# Patient Record
Sex: FEMALE | Race: WHITE
[De-identification: names, ages, dates, MRNs, and addresses within clinical notes are randomized per-mention and may not be internally consistent; named-entity substitution may affect disease eponyms.]

---

## 2023-04-18 ENCOUNTER — HOSPITAL ENCOUNTER (INPATIENT)
Dept: HOSPITAL 94 - ADULT MH | Age: 18
LOS: 6 days | Discharge: HOME | DRG: 885 | End: 2023-04-24
Attending: PSYCHIATRY & NEUROLOGY | Admitting: PSYCHIATRY & NEUROLOGY
Payer: COMMERCIAL

## 2023-04-18 VITALS — BODY MASS INDEX: 22.39 KG/M2 | WEIGHT: 142.64 LBS | HEIGHT: 67 IN

## 2023-04-18 VITALS — DIASTOLIC BLOOD PRESSURE: 68 MMHG | SYSTOLIC BLOOD PRESSURE: 119 MMHG

## 2023-04-18 DIAGNOSIS — R45.851: ICD-10-CM

## 2023-04-18 DIAGNOSIS — Z81.8: ICD-10-CM

## 2023-04-18 DIAGNOSIS — Z20.822: ICD-10-CM

## 2023-04-18 DIAGNOSIS — Z86.59: ICD-10-CM

## 2023-04-18 DIAGNOSIS — Z63.72: ICD-10-CM

## 2023-04-18 DIAGNOSIS — Z79.899: ICD-10-CM

## 2023-04-18 DIAGNOSIS — Z81.1: ICD-10-CM

## 2023-04-18 DIAGNOSIS — F17.200: ICD-10-CM

## 2023-04-18 DIAGNOSIS — J45.909: ICD-10-CM

## 2023-04-18 DIAGNOSIS — F12.10: ICD-10-CM

## 2023-04-18 DIAGNOSIS — F31.30: Primary | ICD-10-CM

## 2023-04-18 DIAGNOSIS — F41.0: ICD-10-CM

## 2023-04-18 PROCEDURE — 83036 HEMOGLOBIN GLYCOSYLATED A1C: CPT

## 2023-04-18 PROCEDURE — 36415 COLL VENOUS BLD VENIPUNCTURE: CPT

## 2023-04-18 PROCEDURE — 80061 LIPID PANEL: CPT

## 2023-04-18 PROCEDURE — 87811 SARS-COV-2 COVID19 W/OPTIC: CPT

## 2023-04-18 PROCEDURE — 87081 CULTURE SCREEN ONLY: CPT

## 2023-04-18 RX ADMIN — NICOTINE POLACRILEX PRN MG: 2 LOZENGE ORAL at 16:34
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
## 2023-04-19 VITALS — DIASTOLIC BLOOD PRESSURE: 67 MMHG | SYSTOLIC BLOOD PRESSURE: 116 MMHG

## 2023-04-19 VITALS — DIASTOLIC BLOOD PRESSURE: 72 MMHG | SYSTOLIC BLOOD PRESSURE: 107 MMHG

## 2023-04-19 LAB
CHOLEST SERPL-MCNC: 171 MG/DL (ref 0–200)
CHOLEST/HDLC SERPL: 3.3 {RATIO} (ref 0–4.99)
HBA1C MFR BLD: 5.4 % (ref 4.5–6.2)
HDLC SERPL-MCNC: 52 MG/DL (ref 35–60)
LDLC SERPL DIRECT ASSAY-MCNC: 98 MG/DL (ref 50–100)
TRIGL SERPL-MCNC: 117 MG/DL (ref 20–135)

## 2023-04-19 RX ADMIN — NICOTINE POLACRILEX PRN MG: 2 LOZENGE ORAL at 18:57

## 2023-04-19 RX ADMIN — NICOTINE SCH PATCH: 21 PATCH, EXTENDED RELEASE TRANSDERMAL at 08:33

## 2023-04-19 RX ADMIN — NICOTINE POLACRILEX PRN MG: 2 LOZENGE ORAL at 13:57

## 2023-04-19 RX ADMIN — ARIPIPRAZOLE SCH MG: 15 TABLET ORAL at 21:03

## 2023-04-19 NOTE — NUR
Assumed pt care at 1400.  Pt is currently reading a book in her room, denies MH symptoms 
currently.

## 2023-04-19 NOTE — NUR
Nurses Progress Note:



Problem: Pt admitted from [place] [facility] on a 5150 for DTS. Pt drove herself to the ER 
reporting, "If I go home, I will take pills so I can die."



Interventions: 1:1 assessment, therapeutic communication, active listening, provided clear 
and simple instructions, medication administration/education/monitoring, behavior monitoring 
and intervention as needed; provided distraction, direction, positive reinforcement, reality 
orientation, maintained a safe and supportive environment, and Q15 minute safety checks.



Response: Pt received in rec room where she is listening to peers play the Cubresar. Pt is 
pleasant and cooperative. Pt is alert and oriented x4. Pt has no scheduled HS medications 
but was given Trazodone 50 mg for sleep. Pt is social with peers. Encourage pt. to attend 
groups during the day and attend to ADLs before going to sleep. Pt endorses SI with a plan 
to overdose but does not want to act on. Pt denies HI/AH/VH. Monitor for safety q15 minutes. 




Plan: Pt. continues to require medication adjustments and a safe and supportive environment.

## 2023-04-19 NOTE — NUR
NURSING PROGRESS NOTE



Problem: Pt admitted from [facility] on a 5150 for DTS. Pt drove herself to the ER 
reporting, "If I go home, I will take pills so I can die." 



Interventions: Provided 1:1 assessment with therapeutic communication and active listening; 
Maintained a safe and supportive environment; provided clear and simple instructions, 
attempted to orient to reality, provided redirection, limit setting, and maintained Q 15min 
safety checks. 



Response: Received pt sleeping at shift change. Pt woke for breakfast. Pt observed after 
breakfast drawing while sitting on her bed. Pt states she likes to draw "when I have time." 
Pt later tracked this nurse down and asked for her Nicotine Patch. She shared she was 
feeling anxious. Administered PRN Atarax 50mg with good effect.  During morning assessment 
pt shared she has been having problems with her moods. She states, "like this morning, I 
thought I was doing okay feeling better than the next minute I felt irritated and sad, like 
I could cry."  She reports sleeping well last night after taking Trazodone for sleep. 



Plan: Pt is in need of a safe and supportive environment at this time.

## 2023-04-20 VITALS — SYSTOLIC BLOOD PRESSURE: 118 MMHG | DIASTOLIC BLOOD PRESSURE: 70 MMHG

## 2023-04-20 VITALS — SYSTOLIC BLOOD PRESSURE: 93 MMHG | DIASTOLIC BLOOD PRESSURE: 51 MMHG

## 2023-04-20 RX ADMIN — NICOTINE POLACRILEX PRN MG: 2 LOZENGE ORAL at 13:26

## 2023-04-20 RX ADMIN — Medication SCH UNIT: at 08:00

## 2023-04-20 RX ADMIN — ARIPIPRAZOLE SCH MG: 15 TABLET ORAL at 20:43

## 2023-04-20 RX ADMIN — NICOTINE POLACRILEX PRN MG: 2 LOZENGE ORAL at 10:06

## 2023-04-20 RX ADMIN — NICOTINE SCH PATCH: 21 PATCH, EXTENDED RELEASE TRANSDERMAL at 08:08

## 2023-04-20 RX ADMIN — NICOTINE POLACRILEX PRN MG: 2 LOZENGE ORAL at 18:22
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
## 2023-04-20 NOTE — NUR
LVN documentation:

I have reviewed and agree with all interventions, assessments performed and documented by 
Carley BARTON.

## 2023-04-20 NOTE — NUR
Nurses Progress Note: Marisol LOCKWOOD



Problem: Pt admitted from [place] [facility] on a 5150 for DTS. Pt drove herself to the ER 
reporting, "If I go home, I will take pills so I can die."



Interventions: 1:1 assessment, therapeutic communication, active listening, provided clear 
and simple instructions, medication administration/education/monitoring, behavior monitoring 
and intervention as needed; provided distraction, direction, positive reinforcement, reality 
orientation, maintained a safe and supportive environment, and Q15 minute safety checks.



Response: Received pt. in rec room, pt. socializing with peers. During 1:1 interview pt. 
started to cry saying, I made so many mistakes in the past and I dont like myself for 
hurting the people I love. Writer used therapeutic listening, then reassured pt. that she 
can overcome her past mistakes. Pt denies SI/HI/AH/VH. Her depression is rated 8/10 with her 
anxiety 7/10. Atarax 50mg given. Encouraged pt. to attend groups to learn coping skills and 
try to journal.  Pt is medication compliant. Nicotine lozenge was given with medication 
pass. Monitor for safety q15 minutes for safety.



Plan: Pt. continues to require medication adjustments and a safe and supportive environment.

## 2023-04-20 NOTE — NUR
Nurses Progress Note: Marisol



Problem: Pt admitted from [place] ER on a 5150 for DTS. Pt drove herself to the ER 
reporting, "If I go home, I will take pills so I can die."



Interventions: Provided 1:1 assessment, therapeutic communication, active listening, 
medication administration/education/monitoring, provided positive reinforcement, reality 
orientation, maintained a safe and supportive environment, and Q15 minute safety checks.



Response: Patient approached this writer shortly after shift change endorsing feelings on 
anxiety. She was given PRN Atarax per MD order with noted effectiveness. Pt joined for 
breakfast in the group room with peers and was receptive to scheduled medication. She is 
pleasant, social, and cooperative with care. Patient denies SI/HI, AH or VH. She endorsed 
feeling super anxious after lunch and was provided with PRN medication per MD order. Pt 
was noted to be active on the unit the majority of the shift. She was observed sitting in 
the recreation room listening to another peer play the guitar. Pt joined for all meal and 
snack times in the group room today. 



Plan: Pt. continues to require medication adjustments and a safe and supportive environment.

## 2023-04-21 VITALS — SYSTOLIC BLOOD PRESSURE: 123 MMHG | DIASTOLIC BLOOD PRESSURE: 74 MMHG

## 2023-04-21 VITALS — DIASTOLIC BLOOD PRESSURE: 78 MMHG | SYSTOLIC BLOOD PRESSURE: 102 MMHG

## 2023-04-21 RX ADMIN — ARIPIPRAZOLE SCH MG: 15 TABLET ORAL at 20:19

## 2023-04-21 RX ADMIN — NICOTINE POLACRILEX PRN MG: 2 LOZENGE ORAL at 13:31

## 2023-04-21 RX ADMIN — NICOTINE POLACRILEX PRN MG: 2 LOZENGE ORAL at 18:07

## 2023-04-21 RX ADMIN — NICOTINE SCH PATCH: 21 PATCH, EXTENDED RELEASE TRANSDERMAL at 08:32

## 2023-04-21 RX ADMIN — NICOTINE POLACRILEX PRN MG: 2 LOZENGE ORAL at 08:35

## 2023-04-21 RX ADMIN — Medication SCH UNIT: at 08:00

## 2023-04-21 NOTE — NUR
Nurses Progress Note: 



Problem: Pt admitted from [facility] on a 5150 for DTS. Pt drove herself to the ER 
reporting, "If I go home, I will take pills so I can die."



Interventions: Provided 1:1 assessment, therapeutic communication, active listening, 
medication administration/education/monitoring, provided positive reinforcement, reality 
orientation, maintained a safe and supportive environment, and Q15 minute safety checks.



Response: 1:1 done at bedside, medications administered with no issues. Denies SI/HI but is 
still feeling anxious and depressed. She requested her nicotine lozenge at 0705 which she 
stated helped her anxiety. Pt states she had feelings of nausea and one episode of loose 
stools this AM but once she ate breakfast she felt better. Nurse educated pt on possible 
side effects of new medication and that they should subside over time. Pt was ok with. Pt 
birth control discontinued per pt request, she voiced concerns of it causing mood 
fluctuations. Pt attended group. Pt was calm and cooperative this shift. Seen socializing 
with peers. 



Plan: Pt. continues to require medication adjustments and a safe and supportive environment.

## 2023-04-21 NOTE — NUR
Covid test obtained and sent to the lab.

-------------------------------------------------------------------------------

Addendum: 04/21/23 at 1617 by Dena Nielsen RN

-------------------------------------------------------------------------------

Covid test to be obtained Sunday 4/23. Test obtained this shift in error.

## 2023-04-21 NOTE — NUR
pt  in community room, singing while another played guitar.  the guitar got passed off to 
another pt and much conversation with other pts. took HS medications and went to bed.

## 2023-04-21 NOTE — NUR
DISCHARGE PLAN-MON 4/24/23 8 AM



Marisol's parents, Suleiman and Kia, are planning on picking Marisol up around 8 AM on 
Monday to drive her to [facility]. She is going to fly to [facility] where she will get 
picked up by staff from the facility, [facility]. [facility] has been accepted at [facility] and they will have a bed available to her on Monday. They have requested 
her medications get sent to [facility] (pharmacy is saved in discharge pharmacy). 



Suleiman ph# [phone]

Kia ph# [phone]



ARMAAN Bloom

-------------------------------------------------------------------------------

Addendum: 04/21/23 at 1549 by Jackie Leyva 

-------------------------------------------------------------------------------

Please do a Covid test Sunday PM as required by the facility she has been accepted at.



ARMAAN Bloom

## 2023-04-22 VITALS — DIASTOLIC BLOOD PRESSURE: 64 MMHG | SYSTOLIC BLOOD PRESSURE: 111 MMHG

## 2023-04-22 VITALS — SYSTOLIC BLOOD PRESSURE: 111 MMHG | DIASTOLIC BLOOD PRESSURE: 64 MMHG

## 2023-04-22 VITALS — SYSTOLIC BLOOD PRESSURE: 102 MMHG | DIASTOLIC BLOOD PRESSURE: 63 MMHG

## 2023-04-22 RX ADMIN — NICOTINE SCH PATCH: 21 PATCH, EXTENDED RELEASE TRANSDERMAL at 08:28

## 2023-04-22 RX ADMIN — NICOTINE POLACRILEX PRN MG: 2 LOZENGE ORAL at 18:11

## 2023-04-22 RX ADMIN — ARIPIPRAZOLE SCH MG: 15 TABLET ORAL at 20:50

## 2023-04-22 RX ADMIN — NICOTINE POLACRILEX PRN MG: 2 LOZENGE ORAL at 11:15

## 2023-04-22 NOTE — NUR
Nurses Progress Note:



Problem: Pt admitted from [facility] [facility] on a 5150 for DTS. Pt drove herself to the ER 
reporting, "If I go home, I will take pills so I can die."



Interventions: Provided 1:1 assessment, therapeutic communication, active listening, 
medication administration/education/monitoring, provided positive reinforcement, reality 
orientation, maintained a safe and supportive environment, and Q15 minute safety checks.



Response: Nurse received pt asleep at change of shift, she awoke for breakfast. Pt did not 
feel nauseas or have loose stools this AM. She did have some anxiety with c/o shakiness upon 
rising. Pt stated once she started eating breakfast her anxiety subsided and she declined 
the need for anxiety meds at that time. 1:1 done at bedside. Pt denied SI. Pt had visitors 
this shift and attended group and went outside on the patio. PRN Atarax utilized at 1540 for 
c/o anxiety. Pt stated she tries to push through her anxious feelings because she doesnt 
want to become dependent on medications but shrugged her shoulders and said, but "if I need 
them, I need them." Pt calm, cooperative and pleasant.  





Plan: DISCHARGE PLAN-MON 4/24/23 8 AM;

Marisol's parents, Suleiman and Kia, are planning on picking Marisol up around 8 AM on 
Monday 4/24 to drive her to [facility]. She is going to fly to [facility] where she will 
get picked up by staff from the facility, [facility]. She has been accepted at [facility] and they will have a bed available to her on Monday. They have requested 
her medications get sent to [facility] (pharmacy is saved in discharge pharmacy).

## 2023-04-22 NOTE — NUR
Nurses Progress Note: 



Problem: Pt admitted from [facility] [facility] on a 5150 for DTS. Pt drove herself to the ER 
reporting, "If I go home, I will take pills so I can die."



Interventions: Provided 1:1 assessment, therapeutic communication, active listening, 
medication administration/education/monitoring, provided positive reinforcement, reality 
orientation, maintained a safe and supportive environment, and Q15 minute safety checks.



Response: Patient was received singing with other patient in rec room at change of shift. 
Patient continued to sing different songs until snack time. Patient requested prn clonidine 
for aniexty and called multiple family members before going to bed. 



Plan: Pt. continues to require medication adjustments and a safe and supportive environment.

## 2023-04-22 NOTE — NUR
LVN documentation:

I have reviewed all interventions, assessments performed and documented by Darrion BARTON.

## 2023-04-23 VITALS — SYSTOLIC BLOOD PRESSURE: 109 MMHG | DIASTOLIC BLOOD PRESSURE: 80 MMHG

## 2023-04-23 VITALS — DIASTOLIC BLOOD PRESSURE: 74 MMHG | SYSTOLIC BLOOD PRESSURE: 121 MMHG

## 2023-04-23 RX ADMIN — NICOTINE POLACRILEX PRN MG: 2 LOZENGE ORAL at 15:43

## 2023-04-23 RX ADMIN — NICOTINE POLACRILEX PRN MG: 2 LOZENGE ORAL at 09:28

## 2023-04-23 RX ADMIN — NICOTINE SCH PATCH: 21 PATCH, EXTENDED RELEASE TRANSDERMAL at 08:06

## 2023-04-23 RX ADMIN — ARIPIPRAZOLE SCH MG: 15 TABLET ORAL at 21:11

## 2023-04-23 NOTE — NUR
Initial: Pt admit DX bipolar 1 d/o, depressed and SI per EMR. PO 

fluctuates overall ~59% avg regular diet though improving to ~71% avg past 

2.5 days participating in snacks likely meeting estimated needs. M 4/21. 

No nutrition interventions at this time. Will continue to follow. 

Rec:

1. continue regular diet

2. bowel care per rx

3. weekly wt

-------------------------------------------------------------------------------

Addendum: 04/23/23 at 0916 by Kevin Daniel RD

-------------------------------------------------------------------------------

Amended: Links added.

## 2023-04-23 NOTE — NUR
Nurses Progress Note:



Problem: Pt admitted from [place] ER on a 5150 for DTS. Pt drove herself to the ER 
reporting, "If I go home, I will take pills so I can die."



Interventions: Provide medication administration & medication management; Maintained a safe 
& supportive environment; Clear & simple instructions; Direction & encouragement  regarding 
performance of ADLs; monitored behaviors & maintained clear boundaries; Patient physical 
assessment & 1:1 patient interview; Therapeutic conversation & active listening; Patient 
education & monitoring.



Response: Received patient at 0630 when she was sleeping in her bed.  Patient woke up and 
went to the Community Room at 0700 and sat with her roommate coloring pictures and writing 
notes.  Patient reports she is feeling great today.  Patient is linear in her thought 
process, kind and cooperative.  Patient took her medications without hesitation.  Patient 
received Atarax x1 this morning for c/o anxiety and reported she received good relief from 
her anxiety.  Patient spent much of the afternoon with another male patient, and while he 
played guitar, she sang beautifully.  Wow!!  Both are very talented and they were a pleasure 
to listen to.  Patient ambulated in the hallways multiple rounds this afternoon with another 
peer.  



Plan: DISCHARGE PLAN-MON 4/24/23 8 AM;

Marisol's parents, Suleiman and Kia, are planning on picking Marisol up around 8 AM on 
Monday 4/24 to drive her to [facility]. She is going to fly to [facility] where she will 
get picked up by staff from the facility, [facility]. She has been accepted at [facility] and they will have a bed available to her on Monday. They have requested 
her medications get sent to [facility] (pharmacy is saved in discharge pharmacy).

## 2023-04-23 NOTE — NUR
Nurses Progress Note: 



Problem: Pt admitted from [place] ER on a 5150 for DTS. Pt drove herself to the ER 
reporting, "If I go home, I will take pills so I can die."



Interventions: Provided 1:1 assessment, therapeutic communication, active listening, 
medication administration/education/monitoring, provided positive reinforcement, reality 
orientation, maintained a safe and supportive environment, and Q15 minute safety checks.



Response: Patient sitting in hallway singing songs with a male peer playing the guitar.  
Music was peaceful and well played.  Patient denies all mental health symptoms.  Patient 
denies SI.  Reports she is not feeling depressed at this time.  Patient took her medication 
and went to bed.



Plan: Pt. continues to require medication adjustments and a safe and supportive environment.

## 2023-04-24 VITALS — DIASTOLIC BLOOD PRESSURE: 77 MMHG | SYSTOLIC BLOOD PRESSURE: 111 MMHG

## 2023-04-24 RX ADMIN — NICOTINE SCH PATCH: 21 PATCH, EXTENDED RELEASE TRANSDERMAL at 08:00

## 2023-04-24 NOTE — NUR
Nurses Progress Note: Marisol 



Problem: Pt admitted from [facility] ER on a 5150 for DTS. Pt drove herself to the ER 
reporting, "If I go home, I will take pills so I can die."



Interventions: 1:1 assessment, therapeutic communication, active listening, provided clear 
and simple instructions, medication administration/education/monitoring, behavior monitoring 
and intervention as needed; provided distraction, direction, positive reinforcement, reality 
orientation, maintained a safe and supportive environment, and Q15 minute safety checks.



Response: Pt is socializing in day room, singing while another peer plays guitar. I wrote 
this song and Elier is going to play the guitar while I sing. Pt is pleasant and 
cooperative. Pt is medication compliant and took Trazodone 50 mg for sleep. Pt is anxious 
but excited about her discharge tomorrow. Im flying out of [place] to [facility] 
to a program my parents found. Pt denies SI/HI/AVH or depression. Pt took a shower tonight. 
Monitor for safety.



Plan: Pt. continues to require medication adjustments and a safe and supportive environment.

## 2023-04-24 NOTE — NUR
DISCHARGE NOTE: Pt. picked up by father and [facility]. She is going to 
fly to [facility] where she will get picked up by staff from the facility, [facility]. 
She has been accepted at [facility] and they will have a bed available to her on 
Monday. Medications escripted to [facility] pharmacy. Pt. discharged with all belongings and 
valuables. Pt. signed all discharged paperwork, including discharge medications, firearms 
restriction, emergency phone numbers. pt. denies SI/HI, A/V hallucinations. Pt. is A&Ox4 and 
in no apparent distress.